# Patient Record
Sex: FEMALE | HISPANIC OR LATINO | Employment: FULL TIME | URBAN - METROPOLITAN AREA
[De-identification: names, ages, dates, MRNs, and addresses within clinical notes are randomized per-mention and may not be internally consistent; named-entity substitution may affect disease eponyms.]

---

## 2019-06-11 ENCOUNTER — APPOINTMENT (OUTPATIENT)
Dept: NEUROSURGERY | Age: 29
End: 2019-06-11

## 2019-07-02 ENCOUNTER — APPOINTMENT (OUTPATIENT)
Dept: NEUROSURGERY | Age: 29
End: 2019-07-02

## 2019-07-09 ENCOUNTER — OFFICE VISIT (OUTPATIENT)
Dept: NEUROSURGERY | Age: 29
End: 2019-07-09

## 2019-07-09 DIAGNOSIS — M54.50 CHRONIC MIDLINE LOW BACK PAIN WITHOUT SCIATICA: Primary | ICD-10-CM

## 2019-07-09 DIAGNOSIS — M54.32 LEFT SIDED SCIATICA: ICD-10-CM

## 2019-07-09 DIAGNOSIS — G89.29 CHRONIC MIDLINE LOW BACK PAIN WITHOUT SCIATICA: Primary | ICD-10-CM

## 2019-07-09 PROCEDURE — 99203 OFFICE O/P NEW LOW 30 MIN: CPT | Performed by: NEUROLOGICAL SURGERY

## 2019-07-09 RX ORDER — TRAMADOL HYDROCHLORIDE 50 MG/1
TABLET ORAL
Refills: 0 | COMMUNITY
Start: 2019-07-02

## 2019-07-09 RX ORDER — MELOXICAM 7.5 MG/1
7.5 TABLET ORAL 2 TIMES DAILY
Qty: 60 TABLET | Refills: 1 | Status: SHIPPED | OUTPATIENT
Start: 2019-07-09 | End: 2019-07-11 | Stop reason: SDUPTHER

## 2019-07-09 RX ORDER — METHYLPREDNISOLONE 4 MG/1
4 TABLET ORAL SEE ADMIN INSTRUCTIONS
Qty: 21 TABLET | Refills: 0 | Status: SHIPPED | OUTPATIENT
Start: 2019-07-09

## 2019-07-09 ASSESSMENT — PAIN SCALES - GENERAL: PAINLEVEL: 7-8

## 2019-07-11 DIAGNOSIS — M54.32 LEFT SIDED SCIATICA: ICD-10-CM

## 2019-07-11 DIAGNOSIS — M54.50 CHRONIC MIDLINE LOW BACK PAIN WITHOUT SCIATICA: ICD-10-CM

## 2019-07-11 DIAGNOSIS — G89.29 CHRONIC MIDLINE LOW BACK PAIN WITHOUT SCIATICA: ICD-10-CM

## 2019-07-11 RX ORDER — MELOXICAM 7.5 MG/1
TABLET ORAL
Qty: 180 TABLET | Refills: 1 | Status: SHIPPED | OUTPATIENT
Start: 2019-07-11

## 2019-08-09 ENCOUNTER — HOSPITAL (OUTPATIENT)
Dept: OTHER | Age: 29
End: 2019-08-09
Attending: NEUROLOGICAL SURGERY

## 2019-09-01 ENCOUNTER — HOSPITAL (OUTPATIENT)
Dept: OTHER | Age: 29
End: 2019-09-01
Attending: NEUROLOGICAL SURGERY

## 2019-09-11 ENCOUNTER — HOSPITAL (OUTPATIENT)
Dept: OTHER | Age: 29
End: 2019-09-11
Attending: INTERNAL MEDICINE

## 2019-10-01 ENCOUNTER — HOSPITAL (OUTPATIENT)
Dept: OTHER | Age: 29
End: 2019-10-01
Attending: NEUROLOGICAL SURGERY

## 2019-10-31 ENCOUNTER — HOSPITAL (OUTPATIENT)
Dept: OTHER | Age: 29
End: 2019-10-31
Attending: ANESTHESIOLOGY

## 2019-11-01 ENCOUNTER — HOSPITAL (OUTPATIENT)
Dept: OTHER | Age: 29
End: 2019-11-01

## 2019-11-14 ENCOUNTER — TELEPHONE (OUTPATIENT)
Dept: NEUROSURGERY | Age: 29
End: 2019-11-14

## 2019-11-27 ENCOUNTER — HOSPITAL (OUTPATIENT)
Dept: OTHER | Age: 29
End: 2019-11-27
Attending: ANESTHESIOLOGY

## 2020-01-14 ENCOUNTER — HOSPITAL (OUTPATIENT)
Dept: OTHER | Age: 30
End: 2020-01-14
Attending: ANESTHESIOLOGY

## 2020-02-07 ENCOUNTER — HOSPITAL (OUTPATIENT)
Dept: OTHER | Age: 30
End: 2020-02-07
Attending: ANESTHESIOLOGY

## 2020-03-16 ENCOUNTER — HOSPITAL (OUTPATIENT)
Dept: OTHER | Age: 30
End: 2020-03-16

## 2020-03-16 LAB — HCG POINT OF CARE (5HGRST): NEGATIVE

## 2020-03-16 PROCEDURE — 99283 EMERGENCY DEPT VISIT LOW MDM: CPT | Performed by: EMERGENCY MEDICINE

## 2021-05-26 VITALS
HEIGHT: 62 IN | BODY MASS INDEX: 34.78 KG/M2 | RESPIRATION RATE: 18 BRPM | TEMPERATURE: 97.6 F | DIASTOLIC BLOOD PRESSURE: 66 MMHG | HEART RATE: 76 BPM | SYSTOLIC BLOOD PRESSURE: 102 MMHG | WEIGHT: 189 LBS

## 2022-09-20 ENCOUNTER — EXTERNAL RECORD (OUTPATIENT)
Dept: OTHER | Age: 32
End: 2022-09-20

## 2022-10-13 ENCOUNTER — APPOINTMENT (OUTPATIENT)
Dept: ULTRASOUND IMAGING | Age: 32
End: 2022-10-13
Attending: INTERNAL MEDICINE

## 2022-10-24 ENCOUNTER — OFFICE VISIT (OUTPATIENT)
Dept: CARDIOLOGY | Age: 32
End: 2022-10-24

## 2022-10-24 VITALS
DIASTOLIC BLOOD PRESSURE: 66 MMHG | WEIGHT: 254.2 LBS | SYSTOLIC BLOOD PRESSURE: 136 MMHG | HEIGHT: 64 IN | OXYGEN SATURATION: 98 % | HEART RATE: 97 BPM | BODY MASS INDEX: 43.4 KG/M2

## 2022-10-24 DIAGNOSIS — M54.50 CHRONIC MIDLINE LOW BACK PAIN WITHOUT SCIATICA: ICD-10-CM

## 2022-10-24 DIAGNOSIS — E28.2 PCOS (POLYCYSTIC OVARIAN SYNDROME): ICD-10-CM

## 2022-10-24 DIAGNOSIS — E66.09 CLASS 2 OBESITY DUE TO EXCESS CALORIES WITHOUT SERIOUS COMORBIDITY WITH BODY MASS INDEX (BMI) OF 35.0 TO 35.9 IN ADULT: ICD-10-CM

## 2022-10-24 DIAGNOSIS — E06.3 HASHIMOTO'S DISEASE: ICD-10-CM

## 2022-10-24 DIAGNOSIS — I83.813 VARICOSE VEINS OF BOTH LOWER EXTREMITIES WITH PAIN: Primary | ICD-10-CM

## 2022-10-24 DIAGNOSIS — G89.29 CHRONIC MIDLINE LOW BACK PAIN WITHOUT SCIATICA: ICD-10-CM

## 2022-10-24 PROBLEM — M51.36 DISC DEGENERATION, LUMBAR: Status: ACTIVE | Noted: 2022-10-24

## 2022-10-24 PROBLEM — M51.369 DISC DEGENERATION, LUMBAR: Status: ACTIVE | Noted: 2022-10-24

## 2022-10-24 PROBLEM — E66.9 CLASS 2 OBESITY IN ADULT: Status: ACTIVE | Noted: 2022-10-24

## 2022-10-24 PROBLEM — E66.812 CLASS 2 OBESITY IN ADULT: Status: ACTIVE | Noted: 2022-10-24

## 2022-10-24 PROBLEM — I83.93 VARICOSE VEINS OF BOTH LOWER EXTREMITIES: Status: ACTIVE | Noted: 2022-10-24

## 2022-10-24 PROCEDURE — 99204 OFFICE O/P NEW MOD 45 MIN: CPT | Performed by: INTERNAL MEDICINE

## 2022-10-24 RX ORDER — LEVOTHYROXINE SODIUM 0.07 MG/1
75 TABLET ORAL DAILY
COMMUNITY
Start: 2022-10-14

## 2022-10-24 RX ORDER — METFORMIN HYDROCHLORIDE 500 MG/1
500 TABLET, EXTENDED RELEASE ORAL 2 TIMES DAILY
COMMUNITY
Start: 2022-10-14

## 2022-10-31 ENCOUNTER — IMAGING SERVICES (OUTPATIENT)
Dept: ULTRASOUND IMAGING | Age: 32
End: 2022-10-31
Attending: INTERNAL MEDICINE

## 2022-10-31 DIAGNOSIS — R10.11 RIGHT UPPER QUADRANT PAIN: ICD-10-CM

## 2022-10-31 PROCEDURE — 76705 ECHO EXAM OF ABDOMEN: CPT | Performed by: RADIOLOGY

## 2022-10-31 PROCEDURE — G1004 CDSM NDSC: HCPCS | Performed by: RADIOLOGY

## 2022-11-01 ENCOUNTER — ANCILLARY PROCEDURE (OUTPATIENT)
Dept: CARDIOLOGY | Age: 32
End: 2022-11-01
Attending: INTERNAL MEDICINE

## 2022-11-01 DIAGNOSIS — E66.09 CLASS 2 OBESITY DUE TO EXCESS CALORIES WITHOUT SERIOUS COMORBIDITY WITH BODY MASS INDEX (BMI) OF 35.0 TO 35.9 IN ADULT: ICD-10-CM

## 2022-11-01 PROCEDURE — 93970 EXTREMITY STUDY: CPT | Performed by: INTERNAL MEDICINE

## 2022-11-03 ENCOUNTER — APPOINTMENT (OUTPATIENT)
Dept: SURGERY | Age: 32
End: 2022-11-03

## 2022-11-15 ENCOUNTER — TELEPHONE (OUTPATIENT)
Dept: CARDIOLOGY | Age: 32
End: 2022-11-15

## 2022-11-15 DIAGNOSIS — I83.813 VARICOSE VEINS OF BOTH LOWER EXTREMITIES WITH PAIN: Primary | ICD-10-CM

## 2022-12-08 ENCOUNTER — APPOINTMENT (OUTPATIENT)
Dept: SURGERY | Age: 32
End: 2022-12-08

## 2022-12-09 ENCOUNTER — ANESTHESIA (OUTPATIENT)
Dept: ADMINISTRATIVE | Age: 32
End: 2022-12-09

## 2022-12-09 ENCOUNTER — ANESTHESIA EVENT (OUTPATIENT)
Dept: ADMINISTRATIVE | Age: 32
End: 2022-12-09

## 2022-12-09 ENCOUNTER — HOSPITAL ENCOUNTER (OUTPATIENT)
Dept: ADMINISTRATIVE | Age: 32
Discharge: HOME | End: 2022-12-09
Attending: SPECIALIST

## 2022-12-09 VITALS
DIASTOLIC BLOOD PRESSURE: 78 MMHG | HEART RATE: 90 BPM | WEIGHT: 255 LBS | TEMPERATURE: 98.1 F | HEIGHT: 62 IN | SYSTOLIC BLOOD PRESSURE: 114 MMHG | RESPIRATION RATE: 20 BRPM | OXYGEN SATURATION: 99 % | BODY MASS INDEX: 46.93 KG/M2

## 2022-12-09 DIAGNOSIS — R19.7 DIARRHEA, UNSPECIFIED TYPE: ICD-10-CM

## 2022-12-09 LAB
ADV 40+41 FIB PROT STL QL NAA+PROBE: NOT DETECTED
ASTRO TYP 1-8 RNA STL QL NAA+NON-PROBE: NOT DETECTED
C CAYETANENSIS DNA STL QL NAA+NON-PROBE: NOT DETECTED
C COLI+JEJ+LAR 16S RRNA STL QL NAA+PROBE: NOT DETECTED
C PARVUM+HOMINIS COWP STL QL NAA+PROBE: NOT DETECTED
E HISTOLYTICA 18S RRNA STL QL NAA+PROBE: NOT DETECTED
EAEC PAA PLAS AGGR+AATA ST NAA+NON-PRB: NOT DETECTED
EC STX1+STX2 GENES STL QL NAA+NON-PROBE: NOT DETECTED
EPEC EAE GENE STL QL NAA+NON-PROBE: NOT DETECTED
ETEC ELTA+ESTB GENES STL QL NAA+PROBE: NOT DETECTED
G LAMBLIA 18S RRNA STL QL NAA+PROBE: NOT DETECTED
HCG UR QL: NEGATIVE
NOROVIRUS GI+II RNA STL QL NAA+NON-PROBE: NOT DETECTED
P SHIGELLOIDES DNA STL QL NAA+NON-PROBE: NOT DETECTED
RVA VP6 STL QL NAA+PROBE: NOT DETECTED
SALMONELLA SP INVA+FLIC STL QL NAA+PROBE: NOT DETECTED
SAPO I+II+IV+V RNA STL QL NAA+NON-PROBE: NOT DETECTED
SHIGELLA SP+EIEC IPAH ST NAA+NON-PROBE: NOT DETECTED
V CHOL+PARA+VUL DNA STL QL NAA+NON-PROBE: NOT DETECTED
VIBRIO CHOL TOXIN CTXA STL QL NAA+PROBE: NOT DETECTED
Y ENTEROCOL DNA STL QL NAA+NON-PROBE: NOT DETECTED

## 2022-12-09 PROCEDURE — 13000029 HB GI MAJOR COMPLEX CASE EA ADD MINUTE

## 2022-12-09 PROCEDURE — 87507 IADNA-DNA/RNA PROBE TQ 12-25: CPT | Performed by: SPECIALIST

## 2022-12-09 PROCEDURE — 88305 TISSUE EXAM BY PATHOLOGIST: CPT | Performed by: SPECIALIST

## 2022-12-09 PROCEDURE — 84703 CHORIONIC GONADOTROPIN ASSAY: CPT

## 2022-12-09 PROCEDURE — 10002800 HB RX 250 W HCPCS

## 2022-12-09 PROCEDURE — 13000001 HB PHASE II RECOVERY EA 30 MINUTES

## 2022-12-09 PROCEDURE — 13000028 HB GI MAJOR COMPLEX CASE S/U + 1ST 15 MIN

## 2022-12-09 PROCEDURE — 13000008 HB ANESTHESIA MAC OUTSIDE OR

## 2022-12-09 RX ORDER — ONDANSETRON 2 MG/ML
4 INJECTION INTRAMUSCULAR; INTRAVENOUS
Status: DISCONTINUED | OUTPATIENT
Start: 2022-12-09 | End: 2022-12-09 | Stop reason: HOSPADM

## 2022-12-09 RX ADMIN — PROPOFOL 75 MCG/KG/MIN: 10 INJECTION, EMULSION INTRAVENOUS at 14:58

## 2022-12-09 ASSESSMENT — PAIN SCALES - GENERAL
PAINLEVEL_OUTOF10: 0

## 2022-12-14 LAB
ASR DISCLAIMER: NORMAL
CASE RPRT: NORMAL
CLINICAL INFO: NORMAL
PATH REPORT.FINAL DX SPEC: NORMAL
PATH REPORT.GROSS SPEC: NORMAL

## 2023-01-13 ENCOUNTER — HOSPITAL ENCOUNTER (OUTPATIENT)
Dept: CT IMAGING | Age: 33
Discharge: HOME | End: 2023-01-13
Attending: SPECIALIST

## 2023-01-13 DIAGNOSIS — N18.1 CHRONIC KIDNEY DISEASE, STAGE 1, NORMAL OR INCREASED GFR: ICD-10-CM

## 2023-01-13 PROCEDURE — 74177 CT ABD & PELVIS W/CONTRAST: CPT

## 2023-01-13 PROCEDURE — 10002805 HB CONTRAST AGENT: Performed by: SPECIALIST

## 2023-01-13 RX ADMIN — IOHEXOL 90 ML: 350 INJECTION, SOLUTION INTRAVENOUS at 15:41

## 2023-01-26 ENCOUNTER — APPOINTMENT (OUTPATIENT)
Dept: SURGERY | Age: 33
End: 2023-01-26

## 2023-02-02 ENCOUNTER — APPOINTMENT (OUTPATIENT)
Dept: SURGERY | Age: 33
End: 2023-02-02

## 2024-01-08 ENCOUNTER — HOSPITAL ENCOUNTER (OUTPATIENT)
Age: 34
Discharge: HOME | End: 2024-01-08
Payer: COMMERCIAL

## 2024-01-08 ENCOUNTER — APPOINTMENT (OUTPATIENT)
Dept: GENERAL RADIOLOGY | Age: 34
End: 2024-01-08
Attending: PHYSICIAN ASSISTANT
Payer: COMMERCIAL

## 2024-01-08 VITALS
BODY MASS INDEX: 44.53 KG/M2 | WEIGHT: 242 LBS | HEIGHT: 62 IN | TEMPERATURE: 97 F | HEART RATE: 92 BPM | OXYGEN SATURATION: 97 % | SYSTOLIC BLOOD PRESSURE: 114 MMHG | RESPIRATION RATE: 20 BRPM | DIASTOLIC BLOOD PRESSURE: 65 MMHG

## 2024-01-08 DIAGNOSIS — M79.672 LEFT FOOT PAIN: Primary | ICD-10-CM

## 2024-01-08 PROBLEM — I83.93 VARICOSE VEINS OF BOTH LOWER EXTREMITIES: Status: ACTIVE | Noted: 2022-10-24

## 2024-01-08 PROBLEM — M54.32 LEFT SIDED SCIATICA: Status: ACTIVE | Noted: 2019-07-09

## 2024-01-08 PROBLEM — G89.29 CHRONIC BACK PAIN: Status: ACTIVE | Noted: 2020-08-24

## 2024-01-08 PROBLEM — E06.3 HASHIMOTO'S DISEASE: Status: ACTIVE | Noted: 2022-10-24

## 2024-01-08 PROBLEM — M54.9 CHRONIC BACK PAIN: Status: ACTIVE | Noted: 2020-08-24

## 2024-01-08 PROBLEM — M51.36 DISC DEGENERATION, LUMBAR: Status: ACTIVE | Noted: 2022-10-24

## 2024-01-08 PROBLEM — E66.9 CLASS 2 OBESITY IN ADULT: Status: ACTIVE | Noted: 2022-10-24

## 2024-01-08 PROCEDURE — 99204 OFFICE O/P NEW MOD 45 MIN: CPT

## 2024-01-08 PROCEDURE — 99203 OFFICE O/P NEW LOW 30 MIN: CPT

## 2024-01-08 PROCEDURE — 73630 X-RAY EXAM OF FOOT: CPT | Performed by: PHYSICIAN ASSISTANT

## 2024-01-08 RX ORDER — CLINDAMYCIN PHOSPHATE 11.9 MG/ML
SOLUTION TOPICAL
COMMUNITY
Start: 2023-12-12

## 2024-01-08 RX ORDER — LEVOTHYROXINE SODIUM 88 UG/1
88 TABLET ORAL DAILY
COMMUNITY
Start: 2023-06-28

## 2024-01-08 RX ORDER — METFORMIN HYDROCHLORIDE 500 MG/1
500 TABLET, EXTENDED RELEASE ORAL 2 TIMES DAILY
COMMUNITY
Start: 2022-10-14

## 2024-01-08 RX ORDER — ALBUTEROL SULFATE 2.5 MG/3ML
SOLUTION RESPIRATORY (INHALATION)
COMMUNITY
Start: 2023-08-19

## 2024-01-08 RX ORDER — METHYLPREDNISOLONE 4 MG/1
TABLET ORAL
Qty: 1 EACH | Refills: 0 | Status: SHIPPED | OUTPATIENT
Start: 2024-01-08

## 2024-01-08 RX ORDER — SEMAGLUTIDE 2.68 MG/ML
INJECTION, SOLUTION SUBCUTANEOUS
COMMUNITY
Start: 2023-12-04

## 2024-01-08 NOTE — ED INITIAL ASSESSMENT (HPI)
Pt c/o lt foot pain since 12/30.    States she did go bowling on 12/30 and since then having pain.

## 2024-01-08 NOTE — DISCHARGE INSTRUCTIONS
Please return to the Emergency department/clinic if symptoms worsen or you develop new symptoms.  Follow up with your primary care physician in 2 days. Take any medications prescribed to you as instructed and complete any antibiotic prescription you begin.

## 2024-01-08 NOTE — ED PROVIDER NOTES
Patient Seen in: [facility]      History     Chief Complaint   Patient presents with    Leg or Foot Injury     Pain and burning in foot, possible X-ray - Entered by patient    Foot Pain     Stated Complaint: Leg or Foot Injury - Pain and burning in foot, possible X-ray    Subjective:   SAMANTHA Baldwin is a 33-year-old female who presents today for evaluation of left foot pain.  She has a past medical history of PCOS, chronic back pain, obesity, lumbar disc disease and Hashimoto's disease.  She states that starting about 10 days ago she started to have left foot pain.  She did go bowling the day the pain started.  She denies any specific injury.  Since then she has had pain that is burning and tingling to the lateral 2 toes and dorsal aspect of that part of the foot.  She denies overlying skin changes, rash, pain in the leg, ankle or knee.  Objective:   Past Medical History:   Diagnosis Date    Chronic back pain 08/24/2020    Class 2 obesity in adult 10/24/2022    Last Assessment & Plan:    Formatting of this note might be different from the original.   - I believe that her weight is also contributing significantly to her constellation of symptoms   -She also has associated cervical and lumbar spine disease and has had a prior cervical injection and reports having 2 bulging disc in her lower back   -We talked about the importance of an appropriate low carbo    Disc degeneration, lumbar 10/24/2022    Hashimoto's disease 10/24/2022    Last Assessment & Plan:    Formatting of this note might be different from the original.   - On thyroid replacement  Formatting of this note might be different from the original.   Last Assessment & Plan:    Formatting of this note might be different from the original.   - On thyroid replacement    PCOS (polycystic ovarian syndrome)     Varicose veins of both lower extremities 10/24/2022    Last Assessment & Plan:    Formatting of this note might be different from the  original.   - Referred for evaluation of varicose veins   -She notices blue veins.  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  I encouraged her to               Past Surgical History:   Procedure Laterality Date    REMOVAL GALLBLADDER      TONSILLECTOMY                  Social History     Socioeconomic History    Marital status: Single   Tobacco Use    Smoking status: Never    Smokeless tobacco: Never   Vaping Use    Vaping Use: Never used   Substance and Sexual Activity    Alcohol use: Yes              Review of Systems    Positive for stated complaint: Leg or Foot Injury - Pain and burning in foot, possible X-ray  Other systems are as noted in HPI.  Constitutional and vital signs reviewed.      All other systems reviewed and negative except as noted above.    Physical Exam     ED Triage Vitals [01/08/24 1009]   /65   Pulse 92   Resp 20   Temp 97.1 °F (36.2 °C)   Temp src Temporal   SpO2 97 %   O2 Device None (Room air)       Current:/65   Pulse 92   Temp 97.1 °F (36.2 °C) (Temporal)   Resp 20   Ht 157.5 cm (5' 2\")   Wt 109.8 kg   LMP 06/13/2023 (Approximate)   SpO2 97%   BMI 44.26 kg/m²         Physical Exam    Nursing note reviewed. Vital signs reviewed.  General: A&O x 3; well-developed; well-nourished; no acute distress  Head: Normocephalic, atraumatic  Cardio: RRR, no murmurs/clicks/rubs, capillary refill brisk, normal distal pulses  Pulmonary: Normal respirations, lungs CTA  Musculoskeletal: Left foot is normal in appearance.  Normal distal pulses.  Capillary refills less than 2 seconds.  Tender to palpation over the fourth and fifth toes extending to the dorsal aspect of the fourth and fifth metatarsal area.  Negative heel squeeze test.  No tenderness to palpation over the plantar fascia.  Range of motion of the toes  is decreased due to pain.  Ankle exam is unremarkable.  Neurological: CN III - XII grossly intact, full strength and sensation in BL extremities        ED Course   Labs Reviewed - No data to display          Imaging ordered and independently visualized and interpreted by myself, along with review of radiologist's interpretation and noted the following: Negative for acute fracture  Radiology read:  XR FOOT, COMPLETE (MIN 3 VIEWS), LEFT (CPT=73630)  CONCLUSION:  1. Osseous structures are intact.   LOCATION:  [facility]   Dictated by (CST): Latonia Sidhu MD on 1/08/2024 at 10:52 AM     Finalized by (CST): Latonia Sidhu MD on 1/08/2024 at 10:53 AM               [facility]     Medical Decision Making  Patient is a 33-year-old female presents today for evaluation of left fourth and fifth toe/foot pain.  Differential diagnosis includes, but is not limited to toe fracture, foot fracture, radiculopathy, loss of function, and other potentially life-threatening processes.  X-rays are performed and patient is informed of her negative imaging findings.  X-ray reveals normal-appearing foot with mild tenderness to palpation over the dorsal aspect of the fourth and fifth metatarsal as well as the fourth and fifth toes.  Supportive shoes are recommended.  Will start the patient on a Medrol Dosepak.  Podiatry follow-up recommended.  Return precautions discussed.    Amount and/or Complexity of Data Reviewed  Radiology: ordered and independent interpretation performed. Decision-making details documented in ED Course.    Risk  OTC drugs.  Prescription drug management.        Disposition and Plan     Clinical Impression:  1. Left foot pain         Disposition:  Discharge  1/8/2024 11:22 am    Follow-up:  Edel Choi, SWAPNA  [address] [address]  [address] [place] [id number]  [phone]    Schedule an appointment as soon as possible for a visit             Medications Prescribed:  Discharge Medication List as of 1/8/2024 11:24 AM        START taking  these medications    Details   methylPREDNISolone (MEDROL) 4 MG Oral Tablet Therapy Pack Dosepack: take as directed, Normal, Disp-1 each, R-0

## 2025-09-23 ENCOUNTER — APPOINTMENT (OUTPATIENT)
Dept: CARDIOLOGY | Age: 35
End: 2025-09-23

## (undated) NOTE — LETTER
Date & Time: 1/8/2024, 11:25 AM  Patient: Nicolasa Dorantes  Encounter Provider(s):    Lenore Barfield PA-C       To Whom It May Concern:    Nicolasa Dorantes was seen and treated in our department on 1/8/2024. She should not return to work until 1/9/24 .    If you have any questions or concerns, please do not hesitate to call.        _____________________________  Physician/APC Signature